# Patient Record
Sex: MALE | Race: ASIAN | URBAN - METROPOLITAN AREA
[De-identification: names, ages, dates, MRNs, and addresses within clinical notes are randomized per-mention and may not be internally consistent; named-entity substitution may affect disease eponyms.]

---

## 2019-05-10 ENCOUNTER — OFFICE VISIT (OUTPATIENT)
Dept: ORTHOPEDICS | Facility: CLINIC | Age: 39
End: 2019-05-10
Payer: COMMERCIAL

## 2019-05-10 VITALS
BODY MASS INDEX: 19.77 KG/M2 | HEIGHT: 66 IN | SYSTOLIC BLOOD PRESSURE: 136 MMHG | DIASTOLIC BLOOD PRESSURE: 72 MMHG | WEIGHT: 123 LBS

## 2019-05-10 DIAGNOSIS — Z98.890 S/P ARTHROSCOPY OF RIGHT SHOULDER: Primary | ICD-10-CM

## 2019-05-10 DIAGNOSIS — M25.511 RIGHT SHOULDER PAIN, UNSPECIFIED CHRONICITY: ICD-10-CM

## 2019-05-10 DIAGNOSIS — R20.0 NUMBNESS AND TINGLING OF RIGHT ARM: ICD-10-CM

## 2019-05-10 DIAGNOSIS — R20.2 NUMBNESS AND TINGLING OF RIGHT ARM: ICD-10-CM

## 2019-05-10 PROCEDURE — 99203 OFFICE O/P NEW LOW 30 MIN: CPT | Performed by: PEDIATRICS

## 2019-05-10 RX ORDER — LAMOTRIGINE 25 MG/1
TABLET ORAL
Refills: 0 | COMMUNITY
Start: 2019-03-25

## 2019-05-10 RX ORDER — OMEPRAZOLE 40 MG/1
CAPSULE, DELAYED RELEASE ORAL
Refills: 2 | COMMUNITY
Start: 2018-10-24

## 2019-05-10 RX ORDER — DEXTROAMPHETAMINE SACCHARATE, AMPHETAMINE ASPARTATE, DEXTROAMPHETAMINE SULFATE AND AMPHETAMINE SULFATE 3.75; 3.75; 3.75; 3.75 MG/1; MG/1; MG/1; MG/1
TABLET ORAL
COMMUNITY
Start: 2019-05-07

## 2019-05-10 ASSESSMENT — MIFFLIN-ST. JEOR: SCORE: 1425.67

## 2019-05-10 NOTE — PATIENT INSTRUCTIONS
1) Follow with physical therapy for rehab exercises     2) Send a message or call your surgeon in order to let them know you are planning to start physical therapy

## 2019-05-10 NOTE — PROGRESS NOTES
Sports Medicine Clinic Visit    PCP: Clinic, [facility]    Miguel Hernandez is a 38 year old male who is seen  as a self referral presenting with right shoulder pain following a labral repair on 12/19/18.  He has not done any PT following the surgery.  He would like to know if he should begin PT.    He has not seen the surgery since his post op appointment.    Does continue to have pain in his anterior shoulder.  Does have some numbness in his upper arm intermittently.   He is right hand dominant.   Dr. Lisette Rowan performed surgery.   Did have some relief following surgery, but still had discomfort with movements.      Chart review shows patient had Bankart repair.     He reports that the right shoulder clicking noise has resolved. Pain is intermittent and he has avoided doing full range of motion or put weight on his shoulder. With numbness he says that this is almost as if his arm fell asleep. He often cradles his arm until the numbness resolves. The numbness is with the thumb and index finger and radiates into the forearm and occasionally up to the shoulder. Occasionally he has numbness with the other side but never with the middle digit. Overall the numbness is present about 30% of the time. This symptom was present before the surgery. He reports complications with wearing a brace. He also reports possible carpal tunnel with the right arm. He mentions that he plays a significant amount of darts and pool and would like to see improvement in the shoulder in order to do these activities.    Currently compensates by using his left upper extremity for darts, and shooting pool left-handed.    Injury: post surgical pain     Location of Pain: right shoulder.    Duration of Pain: 4-5 year(s)  Rating of Pain at worst: 3/10  Rating of Pain Currently: 1/10  Symptoms are better with: Nothing  Symptoms are worse with: overhead motions   Additional Features:   Positive:    Negative: swelling, bruising,  popping, grinding, catching, locking, instability, paresthesias, numbness, weakness, pain in other joints and systemic symptoms  Other evaluation and/or treatments so far consists of: surgery   Prior History of related problems: ongoing     Social History:      Review of Systems  Musculoskeletal: as above  Remainder of review of systems is negative including constitutional, CV, pulmonary, GI, Skin and Neurologic except as noted in HPI or medical history.    This document serves as a record of the services and decisions personally performed and made by Gabe Romero DO. It was created on his behalf by Arnulfo Harding, a trained medical scribe. The creation of this document is based on the provider's statements to the medical scribe.  Arnulfo Harding 4:36 PM May 10, 2019    Past Medical History:   Diagnosis Date     Asthma      Borderline personality      Depression      Past Surgical History:   Procedure Laterality Date     HAND SURGERY  2009    R hand     knee sugery  2009    L knee     SHOULDER SURGERY Right 2018     Fam hx: Noncontributory    Social History     Socioeconomic History     Marital status: Single     Spouse name: Not on file     Number of children: Not on file     Years of education: Not on file     Highest education level: Not on file   Occupational History     Not on file   Social Needs     Financial resource strain: Not on file     Food insecurity:     Worry: Not on file     Inability: Not on file     Transportation needs:     Medical: Not on file     Non-medical: Not on file   Tobacco Use     Smoking status: Current Every Day Smoker     Years: 0.10     Smokeless tobacco: Never Used   Substance and Sexual Activity     Alcohol use: Yes     Comment: 3-4 beers a week     Drug use: No     Comment: 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 treatment 3 years ago for cocaine abuse; sober for 3 years now     Sexual activity: Not on file   Lifestyle     Physical activity:     Days per week: Not on file     Minutes  "per session: Not on file     Stress: Not on file   Relationships     Social connections:     Talks on phone: Not on file     Gets together: Not on file     Attends [employer] service: Not on file     Active member of club or organization: Not on file     Attends meetings of clubs or organizations: Not on file     Relationship status: Not on file     Intimate partner violence:     Fear of current or ex partner: Not on file     Emotionally abused: Not on file     Physically abused: Not on file     Forced sexual activity: Not on file   Other Topics Concern     Not on file   Social History Narrative     Not on file       Objective  /72   Ht 1.676 m (5' 6\")   Wt 55.8 kg (123 lb)   BMI 19.85 kg/m        GENERAL APPEARANCE: healthy, alert and no distress   GAIT: NORMAL  SKIN: no suspicious lesions or rashes to visible skin   NEURO: Normal strength and tone, mentation intact and speech normal  PSYCH:  mentation appears normal and affect normal/bright  HEENT: no scleral icterus  CV: no extremity edema  RESP: nonlabored breathing    Right Shoulder exam    ROM:        forward flexion lacking about 5 degrees on right compared to left       abduction lacking about 5-10 degrees on right compared to left       internal rotation lacking 4-5 segments lower on the right compared to left       external rotation lacking 10-20 degrees with right compared to left  Stiffness at end range of motion, also some discomfort with full abduction and internal rotation    No focal tenderness    Strength:   Minimally decreased with resisted external rotation compared to left.  Otherwise, flexion, abduction, internal rotation grossly symmetric    Skin:  Well-healed surgical scars noted       well perfused       capillary refill brisk    Sensation:        Sensation changes with the right arm       Cervical Spine Exam    Range of Motion:       Full active and passive ROM forward flexion, extension, lateral rotation, lateral flexion.  He has " some minor stiffness with these motions and no changes to numbness    Inspection:       No visible deformity        normal lordotic curvature maintained    Strength:     C5 (shoulder abduction) symmetric 5/5       C6 (elbow flexion) symmetric 5/5       C7 (elbow extension) symmetric 5/5       C8 (finger abduction, thumb flexion) symmetric 5/5    Reflexes:      C5 (biceps) symmetric normal       C6 (supinator) symmetric normal       C7 (triceps) symmetric normal    Sensation:     grossly intact througout bilateral upper extremities    Special Tests:  Spurling negative    Skin:     well perfused       capillary refill brisk    Lymphatics:  no edema noted in the upper extremities     Negative Tinels sign right,   + increase in sensation change with Phalen's and reverse Phalen's test    Radiology    Prior imaging reviewed, presurgery:    2018 August  MR ARTHROGRAM SHOULDER RIGHT8/24/2018  [employer] [facility]  Result Impression   Impression:   1. Anterior labral tear. There is adjacent chondral delamination and   periosteal stripping. There is also longitudinal tearing in the   cephalad aspect of the middle glenohumeral ligament.  2. Hill-Sachs fracture.  3. Otherwise negative right shoulder MR arthrogram.   Result Narrative   Clinical History: Shoulder pain and numbness for six weeks. History   of dislocation. Instability.    Technique: Multiplanar T1 and T2 weighted sequences of the right   shoulder following intraarticular injection of dilute gadolinium   solution.    Comparison: Arthrogram images from today. Radiographs from 8/17/2018.    Findings:     Rotator Cuff: The rotator cuff tendons are intact without evidence   for tendinosis, tear, muscular atrophy or edema. The rotator cuff   interval is intact.    Coracoacromial Arch: No significant narrowing of the coracoacromial   arch. No downsloping of the acromion, subacromial enthesophyte,   thickening of the coracoacromial ligament, os  acromiale or   degenerative change of the acromioclavicular joint. The patient has a   type II acromion.    Glenohumeral Joint: No intraarticular loose body.    Labrum and Glenohumeral Ligaments: There is an extensive tear in the   anterior labrum. This extends from the 2:00 position to the 6:00   position. There is extension into the substance of the labrum and   labral osseous separation. There is also adjacent periosteal   stripping. The labrum is normal otherwise. There is longitudinal   splitting in the superior aspect of the middle glenohumeral ligament.   The superior glenohumeral ligament and inferior glenohumeral ligament   appear intact.    Biceps Tendon and Groove: No evidence for tendinosis, tear or   dislocation.    Bone/Articular Cartilage: Moderate sized Hill-Sachs fracture. No   evidence of a bony Bankart fracture. There is a small amount of   chondral delamination in the anterior inferior glenoid adjacent to   the labral pathology.    Soft Tissues: The soft tissues appear normal otherwise.   Other Result Information   Interface, [facility] - 08/24/2018  4:38 PM CDT  Clinical History: Shoulder pain and numbness for six weeks. History   of dislocation. Instability.    Technique: Multiplanar T1 and T2 weighted sequences of the right   shoulder following intraarticular injection of dilute gadolinium   solution.    Comparison: Arthrogram images from today. Radiographs from 8/17/2018.    Findings:     Rotator Cuff: The rotator cuff tendons are intact without evidence   for tendinosis, tear, muscular atrophy or edema. The rotator cuff   interval is intact.    Coracoacromial Arch: No significant narrowing of the coracoacromial   arch. No downsloping of the acromion, subacromial enthesophyte,   thickening of the coracoacromial ligament, os acromiale or   degenerative change of the acromioclavicular joint. The patient has a   type II acromion.    Glenohumeral Joint: No intraarticular loose body.    Labrum  and Glenohumeral Ligaments: There is an extensive tear in the   anterior labrum. This extends from the 2:00 position to the 6:00   position. There is extension into the substance of the labrum and   labral osseous separation. There is also adjacent periosteal   stripping. The labrum is normal otherwise. There is longitudinal   splitting in the superior aspect of the middle glenohumeral ligament.   The superior glenohumeral ligament and inferior glenohumeral ligament   appear intact.    Biceps Tendon and Groove: No evidence for tendinosis, tear or   dislocation.    Bone/Articular Cartilage: Moderate sized Hill-Sachs fracture. No   evidence of a bony Bankart fracture. There is a small amount of   chondral delamination in the anterior inferior glenoid adjacent to   the labral pathology.    Soft Tissues: The soft tissues appear normal otherwise.    IMPRESSION:  Impression:   1. Anterior labral tear. There is adjacent chondral delamination and   periosteal stripping. There is also longitudinal tearing in the   cephalad aspect of the middle glenohumeral ligament.  2. Hill-Sachs fracture.  3. Otherwise negative right shoulder MR arthrogram.         Assessment:  1. S/P arthroscopy of right shoulder    2. Right shoulder pain, unspecified chronicity    3. Numbness and tingling of right arm        Plan:  Not quite 5 months out from right shoulder surgery.  Chart reviewed.  See notes in care everywhere regarding his postoperative visit, which was shortly after surgery.  He has not been back to see his surgical team.  Advised physical therapy, as was previously noted by his surgical team.  There is an order already placed, he can proceed with that.    Regarding his right upper extremity numbness and tingling, considerations are carpal tunnel syndrome, also possibly cervical source.  Symptoms are intermittent.  We discussed that there may be some benefit from his symptoms from physical therapy, and plan this next.  Additional  considerations include electrodiagnostic testing, also nighttime wrist bracing, and potentially imaging the cervical spine.  Hold with further investigation for now.  Plan PT, and he will do routine nighttime wrist bracing.  He requested a letter regarding work.  Given his current function, and nearly 5 weeks out from surgery, he may be able to do more than he could previously.  He is requesting some commentary on restrictions for his employer.  I do think this would best come from his surgeon, and he can contact the surgical team.  However, did provide a letter indicating some work restrictions today; these fit with what he is currently doing at work.    Monitor course over the next 1 month with physical therapy.  He may contact the clinic regarding his right upper extremity numbness and tingling if he has persistent symptoms.  Regarding questions or persistent symptoms after postoperative therapy for his surgically repaired right shoulder, advise he contact his surgeon.    Questions answered. The patient indicates understanding of these issues and agrees with the plan.    Gabe Romero, DO, CAQ          Disclaimer: This note consists of symbols derived from keyboarding, dictation and/or voice recognition software. As a result, there may be errors in the script that have gone undetected. Please consider this when interpreting information found in this chart.

## 2019-05-10 NOTE — LETTER
[facility] [place]  [address]  [place] [address]  Phone: [phone]  Fax: [phone]      May 10, 2019      RE: Miguel Hernandez  [address] [place] [address]        To whom it may concern:    Miguel Hernandez was seen in clinic today. The employee is ABLE to return to work next scheduled work date.    When the patient returns to work, the following restrictions apply:  Reach Above Shoulders: occasionally (1-3 hours) on right  Lift, carry, push, and pull:  Up to 20 lbs on right  May use right upper extremity as helper for left as tolerated        Sincerely,            Gabe MAGANA.

## 2019-05-10 NOTE — LETTER
5/10/2019         RE: Miguel Hernandez  [address] [place] [address]        Dear Colleague,    Thank you for referring your patient, Miguel Hernandez, to the [facility] [place]. Please see a copy of my visit note below.    Sports Medicine Clinic Visit    PCP: Clinic, [facility]    Miguel Hernandez is a 38 year old male who is seen  as a self referral presenting with right shoulder pain following a labral repair on 12/19/18.  He has not done any PT following the surgery.  He would like to know if he should begin PT.    He has not seen the surgery since his post op appointment.    Does continue to have pain in his anterior shoulder.  Does have some numbness in his upper arm intermittently.   He is right hand dominant.   Dr. Lisette Rowan performed surgery.   Did have some relief following surgery, but still had discomfort with movements.      Chart review shows patient had Bankart repair.     He reports that the right shoulder clicking noise has resolved. Pain is intermittent and he has avoided doing full range of motion or put weight on his shoulder. With numbness he says that this is almost as if his arm fell asleep. He often cradles his arm until the numbness resolves. The numbness is with the thumb and index finger and radiates into the forearm and occasionally up to the shoulder. Occasionally he has numbness with the other side but never with the middle digit. Overall the numbness is present about 30% of the time. This symptom was present before the surgery. He reports complications with wearing a brace. He also reports possible carpal tunnel with the right arm. He mentions that he plays a significant amount of darts and pool and would like to see improvement in the shoulder in order to do these activities.    Currently compensates by using his left upper extremity for darts, and shooting pool left-handed.    Injury: post surgical pain     Location of Pain:  right shoulder.    Duration of Pain: 4-5 year(s)  Rating of Pain at worst: 3/10  Rating of Pain Currently: 1/10  Symptoms are better with: Nothing  Symptoms are worse with: overhead motions   Additional Features:   Positive:    Negative: swelling, bruising, popping, grinding, catching, locking, instability, paresthesias, numbness, weakness, pain in other joints and systemic symptoms  Other evaluation and/or treatments so far consists of: surgery   Prior History of related problems: ongoing     Social History:      Review of Systems  Musculoskeletal: as above  Remainder of review of systems is negative including constitutional, CV, pulmonary, GI, Skin and Neurologic except as noted in HPI or medical history.    This document serves as a record of the services and decisions personally performed and made by Gabe Romero DO. It was created on his behalf by Arnulfo Harding, a trained medical scribe. The creation of this document is based on the provider's statements to the medical scribe.  Arnulfo Harding 4:36 PM May 10, 2019    Past Medical History:   Diagnosis Date     Asthma      Borderline personality      Depression      Past Surgical History:   Procedure Laterality Date     HAND SURGERY  2009    R hand     knee sugery  2009    L knee     SHOULDER SURGERY Right 2018     Fam hx: Noncontributory    Social History     Socioeconomic History     Marital status: Single     Spouse name: Not on file     Number of children: Not on file     Years of education: Not on file     Highest education level: Not on file   Occupational History     Not on file   Social Needs     Financial resource strain: Not on file     Food insecurity:     Worry: Not on file     Inability: Not on file     Transportation needs:     Medical: Not on file     Non-medical: Not on file   Tobacco Use     Smoking status: Current Every Day Smoker     Years: 0.10     Smokeless tobacco: Never Used   Substance and Sexual Activity      "Alcohol use: Yes     Comment: 3-4 beers a week     Drug use: No     Comment: 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 treatment 3 years ago for cocaine abuse; sober for 3 years now     Sexual activity: Not on file   Lifestyle     Physical activity:     Days per week: Not on file     Minutes per session: Not on file     Stress: Not on file   Relationships     Social connections:     Talks on phone: Not on file     Gets together: Not on file     Attends Judaism service: Not on file     Active member of club or organization: Not on file     Attends meetings of clubs or organizations: Not on file     Relationship status: Not on file     Intimate partner violence:     Fear of current or ex partner: Not on file     Emotionally abused: Not on file     Physically abused: Not on file     Forced sexual activity: Not on file   Other Topics Concern     Not on file   Social History Narrative     Not on file       Objective  /72   Ht 1.676 m (5' 6\")   Wt 55.8 kg (123 lb)   BMI 19.85 kg/m         GENERAL APPEARANCE: healthy, alert and no distress   GAIT: NORMAL  SKIN: no suspicious lesions or rashes to visible skin   NEURO: Normal strength and tone, mentation intact and speech normal  PSYCH:  mentation appears normal and affect normal/bright  HEENT: no scleral icterus  CV: no extremity edema  RESP: nonlabored breathing    Right Shoulder exam    ROM:        forward flexion lacking about 5 degrees on right compared to left       abduction lacking about 5-10 degrees on right compared to left       internal rotation lacking 4-5 segments lower on the right compared to left       external rotation lacking 10-20 degrees with right compared to left  Stiffness at end range of motion, also some discomfort with full abduction and internal rotation    No focal tenderness    Strength:   Minimally decreased with resisted external rotation compared to left.  Otherwise, flexion, abduction, internal rotation grossly symmetric    Skin:  Well-healed surgical scars " noted       well perfused       capillary refill brisk    Sensation:        Sensation changes with the right arm       Cervical Spine Exam    Range of Motion:       Full active and passive ROM forward flexion, extension, lateral rotation, lateral flexion.  He has some minor stiffness with these motions and no changes to numbness    Inspection:       No visible deformity        normal lordotic curvature maintained    Strength:     C5 (shoulder abduction) symmetric 5/5       C6 (elbow flexion) symmetric 5/5       C7 (elbow extension) symmetric 5/5       C8 (finger abduction, thumb flexion) symmetric 5/5    Reflexes:      C5 (biceps) symmetric normal       C6 (supinator) symmetric normal       C7 (triceps) symmetric normal    Sensation:     grossly intact througout bilateral upper extremities    Special Tests:  Spurling negative    Skin:     well perfused       capillary refill brisk    Lymphatics:  no edema noted in the upper extremities     Negative Tinels sign right,   + increase in sensation change with Phalen's and reverse Phalen's test    Radiology    Prior imaging reviewed, presurgery:    2018 August  [facility]  Result Impression   Impression:   1. Anterior labral tear. There is adjacent chondral delamination and   periosteal stripping. There is also longitudinal tearing in the   cephalad aspect of the middle glenohumeral ligament.  2. Hill-Sachs fracture.  3. Otherwise negative right shoulder MR arthrogram.   Result Narrative   Clinical History: Shoulder pain and numbness for six weeks. History   of dislocation. Instability.    Technique: Multiplanar T1 and T2 weighted sequences of the right   shoulder following intraarticular injection of dilute gadolinium   solution.    Comparison: Arthrogram images from today. Radiographs from 8/17/2018.    Findings:     Rotator Cuff: The rotator cuff tendons are intact without evidence   for  tendinosis, tear, muscular atrophy or edema. The rotator cuff   interval is intact.    Coracoacromial Arch: No significant narrowing of the coracoacromial   arch. No downsloping of the acromion, subacromial enthesophyte,   thickening of the coracoacromial ligament, os acromiale or   degenerative change of the acromioclavicular joint. The patient has a   type II acromion.    Glenohumeral Joint: No intraarticular loose body.    Labrum and Glenohumeral Ligaments: There is an extensive tear in the   anterior labrum. This extends from the 2:00 position to the 6:00   position. There is extension into the substance of the labrum and   labral osseous separation. There is also adjacent periosteal   stripping. The labrum is normal otherwise. There is longitudinal   splitting in the superior aspect of the middle glenohumeral ligament.   The superior glenohumeral ligament and inferior glenohumeral ligament   appear intact.    Biceps Tendon and Groove: No evidence for tendinosis, tear or   dislocation.    Bone/Articular Cartilage: Moderate sized Hill-Sachs fracture. No   evidence of a bony Bankart fracture. There is a small amount of   chondral delamination in the anterior inferior glenoid adjacent to   the labral pathology.    Soft Tissues: The soft tissues appear normal otherwise.   Other Result Information   Interface, [facility] - 08/24/2018  4:38 PM CDT  Clinical History: Shoulder pain and numbness for six weeks. History   of dislocation. Instability.    Technique: Multiplanar T1 and T2 weighted sequences of the right   shoulder following intraarticular injection of dilute gadolinium   solution.    Comparison: Arthrogram images from today. Radiographs from 8/17/2018.    Findings:     Rotator Cuff: The rotator cuff tendons are intact without evidence   for tendinosis, tear, muscular atrophy or edema. The rotator cuff   interval is intact.    Coracoacromial Arch: No significant narrowing of the coracoacromial   arch. No  downsloping of the acromion, subacromial enthesophyte,   thickening of the coracoacromial ligament, os acromiale or   degenerative change of the acromioclavicular joint. The patient has a   type II acromion.    Glenohumeral Joint: No intraarticular loose body.    Labrum and Glenohumeral Ligaments: There is an extensive tear in the   anterior labrum. This extends from the 2:00 position to the 6:00   position. There is extension into the substance of the labrum and   labral osseous separation. There is also adjacent periosteal   stripping. The labrum is normal otherwise. There is longitudinal   splitting in the superior aspect of the middle glenohumeral ligament.   The superior glenohumeral ligament and inferior glenohumeral ligament   appear intact.    Biceps Tendon and Groove: No evidence for tendinosis, tear or   dislocation.    Bone/Articular Cartilage: Moderate sized Hill-Sachs fracture. No   evidence of a bony Bankart fracture. There is a small amount of   chondral delamination in the anterior inferior glenoid adjacent to   the labral pathology.    Soft Tissues: The soft tissues appear normal otherwise.    IMPRESSION:  Impression:   1. Anterior labral tear. There is adjacent chondral delamination and   periosteal stripping. There is also longitudinal tearing in the   cephalad aspect of the middle glenohumeral ligament.  2. Hill-Sachs fracture.  3. Otherwise negative right shoulder MR arthrogram.         Assessment:  1. S/P arthroscopy of right shoulder    2. Right shoulder pain, unspecified chronicity    3. Numbness and tingling of right arm        Plan:  Not quite 5 months out from right shoulder surgery.  Chart reviewed.  See notes in care everywhere regarding his postoperative visit, which was shortly after surgery.  He has not been back to see his surgical team.  Advised physical therapy, as was previously noted by his surgical team.  There is an order already placed, he can proceed with that.    Regarding  his right upper extremity numbness and tingling, considerations are carpal tunnel syndrome, also possibly cervical source.  Symptoms are intermittent.  We discussed that there may be some benefit from his symptoms from physical therapy, and plan this next.  Additional considerations include electrodiagnostic testing, also nighttime wrist bracing, and potentially imaging the cervical spine.  Hold with further investigation for now.  Plan PT, and he will do routine nighttime wrist bracing.  He requested a letter regarding work.  Given his current function, and nearly 5 weeks out from surgery, he may be able to do more than he could previously.  He is requesting some commentary on restrictions for his employer.  I do think this would best come from his surgeon, and he can contact the surgical team.  However, did provide a letter indicating some work restrictions today; these fit with what he is currently doing at work.    Monitor course over the next 1 month with physical therapy.  He may contact the clinic regarding his right upper extremity numbness and tingling if he has persistent symptoms.  Regarding questions or persistent symptoms after postoperative therapy for his surgically repaired right shoulder, advise he contact his surgeon.    Questions answered. The patient indicates understanding of these issues and agrees with the plan.    Gabe Romero DO, CAQ          Disclaimer: This note consists of symbols derived from keyboarding, dictation and/or voice recognition software. As a result, there may be errors in the script that have gone undetected. Please consider this when interpreting information found in this chart.        Again, thank you for allowing me to participate in the care of your patient.        Sincerely,        Gabe Romero DO